# Patient Record
Sex: FEMALE | Race: WHITE | HISPANIC OR LATINO
[De-identification: names, ages, dates, MRNs, and addresses within clinical notes are randomized per-mention and may not be internally consistent; named-entity substitution may affect disease eponyms.]

---

## 2021-12-08 PROBLEM — Z00.00 ENCOUNTER FOR PREVENTIVE HEALTH EXAMINATION: Status: ACTIVE | Noted: 2021-12-08

## 2023-03-30 ENCOUNTER — APPOINTMENT (OUTPATIENT)
Dept: NEUROLOGY | Facility: CLINIC | Age: 73
End: 2023-03-30
Payer: MEDICARE

## 2023-03-30 VITALS
OXYGEN SATURATION: 99 % | WEIGHT: 160 LBS | DIASTOLIC BLOOD PRESSURE: 80 MMHG | BODY MASS INDEX: 29.44 KG/M2 | SYSTOLIC BLOOD PRESSURE: 118 MMHG | HEART RATE: 72 BPM | HEIGHT: 62 IN

## 2023-03-30 DIAGNOSIS — Z87.39 PERSONAL HISTORY OF OTHER DISEASES OF THE MUSCULOSKELETAL SYSTEM AND CONNECTIVE TISSUE: ICD-10-CM

## 2023-03-30 DIAGNOSIS — R25.2 CRAMP AND SPASM: ICD-10-CM

## 2023-03-30 DIAGNOSIS — G44.51 HEMICRANIA CONTINUA: ICD-10-CM

## 2023-03-30 DIAGNOSIS — G89.29 CERVICALGIA: ICD-10-CM

## 2023-03-30 DIAGNOSIS — Z86.79 PERSONAL HISTORY OF OTHER DISEASES OF THE CIRCULATORY SYSTEM: ICD-10-CM

## 2023-03-30 DIAGNOSIS — Z91.81 HISTORY OF FALLING: ICD-10-CM

## 2023-03-30 DIAGNOSIS — E53.8 DEFICIENCY OF OTHER SPECIFIED B GROUP VITAMINS: ICD-10-CM

## 2023-03-30 DIAGNOSIS — M54.2 CERVICALGIA: ICD-10-CM

## 2023-03-30 DIAGNOSIS — F17.200 NICOTINE DEPENDENCE, UNSPECIFIED, UNCOMPLICATED: ICD-10-CM

## 2023-03-30 DIAGNOSIS — Z82.49 FAMILY HISTORY OF ISCHEMIC HEART DISEASE AND OTHER DISEASES OF THE CIRCULATORY SYSTEM: ICD-10-CM

## 2023-03-30 PROCEDURE — 99205 OFFICE O/P NEW HI 60 MIN: CPT

## 2023-04-03 PROBLEM — Z82.49 FAMILY HISTORY OF HEART DISEASE: Status: ACTIVE | Noted: 2023-03-29

## 2023-04-03 PROBLEM — Z87.39 HISTORY OF ARTHRITIS: Status: RESOLVED | Noted: 2023-03-29 | Resolved: 2023-04-03

## 2023-04-03 PROBLEM — Z87.39 HISTORY OF OSTEOPOROSIS: Status: RESOLVED | Noted: 2023-03-29 | Resolved: 2023-04-03

## 2023-04-03 PROBLEM — E53.8 B12 DEFICIENCY: Status: RESOLVED | Noted: 2023-03-29 | Resolved: 2023-04-03

## 2023-04-03 PROBLEM — F17.200 CURRENT EVERY DAY SMOKER: Status: ACTIVE | Noted: 2023-03-29

## 2023-04-03 PROBLEM — Z87.39 HISTORY OF BACK PAIN: Status: RESOLVED | Noted: 2023-03-29 | Resolved: 2023-04-03

## 2023-04-03 PROBLEM — Z86.79 HISTORY OF HYPERTENSION: Status: RESOLVED | Noted: 2023-03-29 | Resolved: 2023-04-03

## 2023-04-03 PROBLEM — R25.2 JERKING MOVEMENTS OF EXTREMITIES: Status: RESOLVED | Noted: 2023-03-29 | Resolved: 2023-04-03

## 2023-04-03 PROBLEM — G44.51 HEMICRANIA CONTINUA: Status: RESOLVED | Noted: 2023-03-29 | Resolved: 2023-04-03

## 2023-04-03 PROBLEM — Z91.81 HISTORY OF FALL: Status: RESOLVED | Noted: 2023-03-29 | Resolved: 2023-04-03

## 2023-04-03 PROBLEM — M54.2 NECK PAIN, CHRONIC: Status: RESOLVED | Noted: 2023-03-29 | Resolved: 2023-04-03

## 2023-04-03 RX ORDER — ALBUTEROL 90 MCG
90 AEROSOL (GRAM) INHALATION
Refills: 0 | Status: ACTIVE | COMMUNITY

## 2023-04-03 NOTE — PHYSICAL EXAM
[General Appearance - Alert] : alert [General Appearance - In No Acute Distress] : in no acute distress [Oriented To Time, Place, And Person] : oriented to person, place, and time [Impaired Insight] : insight and judgment were intact [Affect] : the affect was normal [Person] : oriented to person [Place] : oriented to place [Time] : oriented to time [Concentration Intact] : normal concentrating ability [Visual Intact] : visual attention was ~T not ~L decreased [Naming Objects] : no difficulty naming common objects [Repeating Phrases] : no difficulty repeating a phrase [Writing A Sentence] : no difficulty writing a sentence [Fluency] : fluency intact [Comprehension] : comprehension intact [Reading] : reading intact [Past History] : adequate knowledge of personal past history [Cranial Nerves Optic (II)] : visual acuity intact bilaterally,  visual fields full to confrontation, pupils equal round and reactive to light [Cranial Nerves Oculomotor (III)] : extraocular motion intact [Cranial Nerves Trigeminal (V)] : facial sensation intact symmetrically [Cranial Nerves Facial (VII)] : face symmetrical [Cranial Nerves Vestibulocochlear (VIII)] : hearing was intact bilaterally [Cranial Nerves Glossopharyngeal (IX)] : tongue and palate midline [Cranial Nerves Accessory (XI - Cranial And Spinal)] : head turning and shoulder shrug symmetric [Cranial Nerves Hypoglossal (XII)] : there was no tongue deviation with protrusion [Motor Strength] : muscle strength was normal in all four extremities [No Muscle Atrophy] : normal bulk in all four extremities [Sensation Tactile Decrease] : light touch was intact [Abnormal Walk] : normal gait [Balance] : balance was intact [2+] : Ankle jerk left 2+ [Outer Ear] : the ears and nose were normal in appearance [Oropharynx] : the oropharynx was normal [Auscultation Breath Sounds / Voice Sounds] : lungs were clear to auscultation bilaterally [Heart Rate And Rhythm] : heart rate was normal and rhythm regular [Heart Sounds] : normal S1 and S2 [Heart Sounds Gallop] : no gallops [Murmurs] : no murmurs [Heart Sounds Pericardial Friction Rub] : no pericardial rub [Full Pulse] : the pedal pulses are present [Edema] : there was no peripheral edema [Bowel Sounds] : normal bowel sounds [Abdomen Soft] : soft [Abdomen Tenderness] : non-tender [Abdomen Mass (___ Cm)] : no abdominal mass palpated [No CVA Tenderness] : no ~M costovertebral angle tenderness [No Spinal Tenderness] : no spinal tenderness [Nail Clubbing] : no clubbing  or cyanosis of the fingernails [Musculoskeletal - Swelling] : no joint swelling seen [Motor Tone] : muscle strength and tone were normal [Skin Color & Pigmentation] : normal skin color and pigmentation [Skin Turgor] : normal skin turgor [] : no rash [Past-pointing] : there was no past-pointing [Tremor] : no tremor present [Plantar Reflex Right Only] : normal on the right [Plantar Reflex Left Only] : normal on the left [Glabellar Reflex] : the glabellar reflex was absent [FreeTextEntry4] :  mini-mental status and  29/30 [FreeTextEntry9] :  symmetrical deep tendon reflexes.  plantar flexion bilaterally [FreeTextEntry1] :  unsteady gait, ambulates with a cane occasional myoclonic jerking involving upper extremities seen during evaluation

## 2023-04-03 NOTE — DATA REVIEWED
[de-identified] : MRA head and neck without contrast March 18, 2021\par No hemodynamically significant stenosis\par \par \par MRI brain without contrast March 18, 2021\par No acute changes\par Chronic ischemic white matter disease\par Maxillary sinusitis [de-identified] : Eeg March 25, 2021\par Abnormal awake, drowsy and asleep EEG due to the presence of mild diffuse slowing.\par No focal slowing, no epileptiform abnormalities\par \par  [de-identified] : 1. MRI lumbar spine September 3, 2021, multilevel degenerative disc disease, no cauda equina\par 2. MRI thoracic spine without contrast September 3, 2021, multilevel degenerative disc disease without high-grade spinal stenosis,, no cord involved\par 3. MRI cervical spine, no cord compression, multilevel degenerative changes

## 2023-04-03 NOTE — ASSESSMENT
[FreeTextEntry1] : 72 years old F with  multiple and significant past medical history and risk factors for stroke\par \par 1. Unsteady gait, frequent falls\par Multifactorial etiology due to combination of abnormal gait in elderly, chronic degenerative changes, arthritis, neuropathy\par \par  recommendations\par Offered physical therapy evaluation and treatment for gait, balance, neck and low back pain\par - B12 deficiency, treated\par - fall precaution d/w the pt in detail\par - PT, evaluation physical therapy due to high co-pay\par \par 2.  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 Depakote 125 mg capsules,  1 tabl 3 times a day ( increased from twice a day)\par 2.  seizure precaution discussed with the patient in detail\par  3. Safety of driving discussed with the patient in detail\par \par \par 3.  chronic neck pain, low back pain\par  no worse\par  no new neuro deficit\par  no myelopathic signs\par \par 4.  positional dizziness. Improving\par Based on exam, peripheral vestibulopathy\par Imaging studies showed no vascular abnormalities no evidence of vertebral basilar insufficiency\par \par Recommended\par \par Vestibular physical therapy, strongly recommend\par \par Fall precaution\par Proper hydration\par  imaging study performed,\par  MRA brain and neck vessels showed no hemodynamically significant stenosis, no vertebrobasilar insufficiency\par \par 5.  headache with migrainous component\par  location, postural head region, once-twice per week, intensity 4-5/10, associated with photo  phobia\par  under better control with prophylactic treatment, on magnesium 200 mg  twice per week, hold for diarrhea\par riboflavin 200 mg daily\par  abortive treatment with Tylenol 650 mg as needed 3-4 times a day\par \par \par \par 6.  memory impairment\par Based on history and physical exam, patient needs memory impairment workup\par 1. MRI Brain without contrast, evaluate structural brain abnormality, the degree of gliosis\par  consider to repeat MRI brain\par  previous MRI brain without contrast March 18, 2021\par No acute changes\par Chronic ischemic white matter disease\par 2. EEG to check cerebral function, consider to repeat\par 3. Lab workup, reversible etiology of memory loss protocol\par CBC, BMP, Hepatic function, Homocysteine, Folate, Methymalonic acid, magnesium, ESR, CRP, CPK, TSH, Free T3/T4, B1, B2, B6, B12, C, D, E, Lyme, RPR, THOMAS, Hg A1c\par 4. Thiamine 100 mg for 10 days, followed by twice/week\par 5. Healthy lifestyle, nutrition, physical activity discussed in detail\par 6. Treatment of co morbidities,\par 7. Stroke risk factors modification\par 8. Consider official Psycho neurophysiological evaluation,\par 9. Consider PET CT, diagnostic\par Further management depends on patient's condition, diagnostic study work up.\par \par Spent 60 minutes with evaluation, discussion, counseling, answering multiple questions, reassurance and education\par \par On exam no new focal neuro deficit\par \par \par RTC 4-6 months or earlier as needed\par \par  before next visit\par  laboratory workup, reversible etiology of memory loss protocol

## 2023-04-03 NOTE — REVIEW OF SYSTEMS
[Memory Lapses or Loss] : memory loss [Decr. Concentrating Ability] : decreased concentrating ability [Seizures] : convulsions [Dizziness] : dizziness [Lightheadedness] : lightheadedness [Difficulty Walking] : difficulty walking [Anxiety] : anxiety [Depression] : depression [Joint Pain] : joint pain [Negative] : Heme/Lymph [de-identified] :  myoclonic jerking\par  headache\par  unsteady gait [de-identified] :  mood is under control at present time

## 2023-04-03 NOTE — HISTORY OF PRESENT ILLNESS
[FreeTextEntry1] :  follow-up neurology visit March 30, 2023\par  last evaluated in Neurology March 8, 2022\par \par  the patient is a 72 years old right-handed F with a PMH of HTN, anxiety, cervical spinal fusion surgery (C4-C6), who presented 8/16/2019 with c/o increased frequency of falling for the past 5-7 years, unsteady gait, dizziness, jerking movements involving upper extremities and torso\par \par Reported chronic neck pain, abnormal involuntary movements of face and arms, and positional dizziness x 25 years.\par \par \par 1. Falls: past 5 -7  years, getting worse with time, R>L, increased in frequency. Pt was recently hospitalized for trauma secondary to falling off bed in 6/21/19. CT Head done showed no acute findings, and CT Neck done (both CT were non-contrast and were done 6/21/19) showed s/p fusion C4-C6, no acute fracture or subluxation. Pt referred here for frequent falls.\par \par ROS: As per pt, no LOC for any fall, no seizure history or symptoms, + chronic urinary incontinence unchanged from baseline (not present or associated with falls), + constipation, chronic, positive (possible) orthostatic symptoms of lightheadedness chronic, numbness/tingling in hands and legs chronic, and + eye droop/facial asymmetry since 5-6 years. + balance problems/gait unsteadiness chronic since neck surgery 1990s, and arm and facial abnormal involuntary movements (tics) present as well as tremor in hands. Slow, disjointed speech.\par \par Pt also endorsed pressure and radiation of pain from head to jaw (possible trigeminal neuralgia).\par \par 2. Unsteadiness: Unsteady gait, chronic, R>L. see review of system\par \par 3. Neck pain since 1990s (s/p cervical fusion surgery).\par \par B/l, chronic, moderate in intensity, associated with muscle stiffness, decreased ROM, not radiating\par \par 4. Tick: Abnormal motor tics of face and arms, random, appear to affect speech, onset 25 years ago.\par \par 5. Dizziness, positional. Possible orthostatic.\par \par No associated diplopia, new focal weakness or numbness\par \par \par \par she was started on treatment with Depakote 250 mg twice a day on February 24, 2021 for increase frequent myoclonic jerking\par \par reported positive effect from Depakote on previous visit in April 2021\par \par  Diagnostic workup performed, reviewed in detail\par \par 1. Eeg March 25, 2021\par \par Abnormal awake, drowsy and asleep EEG due to the presence of mild diffuse slowing.\par No focal slowing, no epileptiform abnormalities\par \par \par 2. MRA head and neck without contrast March 18, 2021\par No hemodynamically significant stenosis\par \par \par 3. MRI brain without contrast March 18, 2021\par No acute changes\par Chronic ischemic white matter disease\par Maxillary sinusitis\par \par  March 30, 2023\par Patient presents for follow-up\par Reports feeling better\par States the frequency and intensity of jerking movements significantly decreased since on treatment with Depakote, however she still have occasional jerks\par Only occasionally occurs and very mild\par Not associated with confusion\par No nocturnal tongue biting\par No nocturnal urinary incontinence\par \par The intensity of headache decreased, occurs occasionally only\par The frequency and intensity of dizziness occurs occasionally only, positional, short lasting up to 10-15 seconds\par No associated with diplopia, dysarthria, dysphagia, no new focal weakness or numbness\par  patient states headache usually associated with stress\par \par \par Balance, no worsening\par Reports an episode of fall, mechanical, no loss of consciousness, no head or neck trauma associated\par Ambulates with a cane\par States during walking she lean forward\par \par  states he memory became worse, she forgets more\par  describes predominantly short-term memory loss\par Difficulty with\par \par 1. Retaining new information, trouble remembering events, yes\par 2. Handling complex tasks, balancing a checkbook, mild\par 3. Reasoning, difficulty/ unable to cope with unexpected events, no\par 4. Spatial ability and orientation, getting lost in familiar places, mild\par 5. Language, word finding, mild\par 6. Behavior, no changes\par \par Reports no new symptoms\par States has scheduled surgery with ophthalmologist for glaucoma on the right eye, prescheduled surgery at the end of March 2022\par \par No new medications initiated\par No hospitalization since previous evaluation

## 2023-04-03 NOTE — DISCUSSION/SUMMARY
[FreeTextEntry1] : 72 years old F with a PMH of HTN, anxiety, cervical spinal fusion surgery (C4-C6), chronic neck and low back pain initially presented with\par - increased frequency of falling, unsteady gait, dizziness abnormal jerking,  headache\par No worsening\par \par 1. Unsteady gait, frequent falls\par Multifactorial etiology due to combination of abnormal gait in elderly, chronic degenerative changes, arthritis, neuropathy\par Associated with urine dysfunction,  long-standing, no worsening, follows with urology\par Low back pain, neck pain and stiffness-no worsening\par  no new focal neuro deficit, no myelopathic signs on exam\par \par  recommendations\par Continue\par Voltaren gel 1% topical on neck and low back area, 3-4 times a day as needed\par Offered physical therapy evaluation and treatment for gait, balance, neck and low back pain\par Based on CC, HPI, neurological exam - multifactorial etiology due to combination of abnormal gait in elderly, chronic degenerative changes, polyneuropathy, vestibulopathy, spondylosis\par -Known longstanding spinal problems\par  Diagnostic workup performed in the past\par  MRI spine showed no spinal cord involvement\par - B12 deficiency, treated\par - fall precaution d/w the pt in detail\par - PT, evaluation physical therapy due to high co-pay\par \par II.  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 Depakote 125 mg capsules,  1 tabl 3 times a day \par 2. Fall precaution discussed with the patient in detail\par \par \par \par III.  chronic neck pain, low back pain\par  no worse\par  no new neuro deficit\par  no myelopathic signs\par \par III.  positional dizziness. Improving\par Based on exam, peripheral vestibulopathy\par Imaging studies showed no vascular abnormalities no evidence of vertebral basilar insufficiency\par \par Recommended\par \par Vestibular physical therapy, strongly recommend\par \par Fall precaution\par Proper hydration\par  imaging study performed,\par  MRA brain and neck vessels showed no hemodynamically significant stenosis, no vertebrobasilar insufficiency\par \par IV. Dysphagia\par Evaluated by speech and swallow therapist 1 year ago\par Choking precaution discussed with the patient in detail\par \par \par Plan\par \par 1. Choking precaution, aspiration precaution discussed with the patient\par 2. Recommended small dose of Valium 2 mg as needed twice a day, will benefit for anxiety, muscle stiffness, insomnia\par  patient takes occasional only\par \par V. anxiety\par  comorbidities\par Recommended to continue Paxil 20 mg every day\par B12 1000 mcg daily\par Vit D 2000 IU QD\par Vit E 400 IU QD x 3 mo\par Proper hydration\par Baclofen 10 mg 2-3 times a day if needed only for neck muscle stiffness, back muscle stiffness\par Naproxen 500 mg twice a day as needed to take his foot\par \par VI.  memory impairment\par Based on history and physical exam, patient needs memory impairment workup\par 1. MRI Brain without contrast, evaluate structural brain abnormality, the degree of gliosis\par  consider to repeat MRI brain\par  previous MRI brain without contrast March 18, 2021\par No acute changes\par Chronic ischemic white matter disease\par 2. EEG to check cerebral function, consider to repeat\par 3. Lab workup, reversible etiology of memory loss protocol\par CBC, BMP, Hepatic function, Homocysteine, Folate, Methymalonic acid, magnesium, ESR, CRP, CPK, TSH, Free T3/T4, B1, B2, B6, B12, C, D, E, Lyme, RPR, THOMAS, Hg A1c\par 4. Thiamine 100 mg for 10 days, followed by twice/week\par 5. Healthy lifestyle, nutrition, physical activity discussed in detail\par 6. Treatment of co morbidities,\par 7. Stroke risk factors modification\par 8. Consider official Psycho neurophysiological evaluation,\par 9. Consider PET CT\par \par  headache, no worsening\par  positive migrainosus signs\par Further management depends on patient's condition, diagnostic study work up.\par \par Spent 60 minutes with evaluation, discussion, counseling, answering multiple questions, reassurance and education\par \par On exam no new focal neuro deficit\par \par \par RTC 4-6 months or earlier as needed\par \par \par  discussion\par Smoking cessation discussed with the patient in detail\par Medication compliance, medication side effect and adverse reaction,\par The importance of compliance with medications was reinforced,\par High frequency and serious potential medication adverse effects were reviewed with the patient in detail, including but not exclusive to psychiatric effects, dizziness, decreased concentration.\par Information sheets on medication side effects were made available to the patient in our clinic.\par Stroke risk factors modification, blood pressure control, heart rate and rhythm control, cholesterol and glucose level control,\par Stress reduction,\par Healthy lifestyle,\par Healthy nutrition, Weight modification,\par Physical activity,\par Sleep hygiene and the risk of sleep disruption/deprivation were discussed.\par Fall precaution.\par As per my usual protocol the patient was advised in regards to risks and driving when on medicamentous treatment with s/e as dizziness, drowsiness,\par Recommendations include avoid driving, not to swim alone, not to be bathing alone with locked door, climbing to high places and operating heavy machinery while on medications with the above s/e, to prevent fall, accidents, injury\par Patient was educated in regards to bone health and an increased risk of osteoporosis,\par \par The patient understood the advice\par \par Spent 60 mins with evaluation, counselling, discussion with pt\par \par \par \par

## 2023-06-21 ENCOUNTER — FORM ENCOUNTER (OUTPATIENT)
Age: 73
End: 2023-06-21

## 2023-07-20 ENCOUNTER — FORM ENCOUNTER (OUTPATIENT)
Age: 73
End: 2023-07-20

## 2023-07-26 ENCOUNTER — APPOINTMENT (OUTPATIENT)
Dept: NEUROLOGY | Facility: CLINIC | Age: 73
End: 2023-07-26

## 2023-08-22 ENCOUNTER — APPOINTMENT (OUTPATIENT)
Dept: NEUROLOGY | Facility: CLINIC | Age: 73
End: 2023-08-22
Payer: MEDICARE

## 2023-08-22 VITALS
BODY MASS INDEX: 29.44 KG/M2 | HEIGHT: 62 IN | OXYGEN SATURATION: 99 % | WEIGHT: 160 LBS | SYSTOLIC BLOOD PRESSURE: 116 MMHG | HEART RATE: 76 BPM | DIASTOLIC BLOOD PRESSURE: 82 MMHG

## 2023-08-22 DIAGNOSIS — R56.9 UNSPECIFIED CONVULSIONS: ICD-10-CM

## 2023-08-22 DIAGNOSIS — Z86.69 PERSONAL HISTORY OF OTHER DISEASES OF THE NERVOUS SYSTEM AND SENSE ORGANS: ICD-10-CM

## 2023-08-22 PROCEDURE — 99215 OFFICE O/P EST HI 40 MIN: CPT

## 2023-08-22 RX ORDER — PAROXETINE HYDROCHLORIDE 20 MG/1
20 TABLET, FILM COATED ORAL DAILY
Qty: 90 | Refills: 1 | Status: COMPLETED | COMMUNITY
Start: 2023-06-20 | End: 2023-08-22

## 2023-08-22 RX ORDER — FLUCONAZOLE 150 MG/1
150 TABLET ORAL
Qty: 2 | Refills: 0 | Status: COMPLETED | COMMUNITY
Start: 2023-01-25 | End: 2023-08-22

## 2023-08-22 RX ORDER — BUDESONIDE AND FORMOTEROL FUMARATE DIHYDRATE 80; 4.5 UG/1; UG/1
80-4.5 AEROSOL RESPIRATORY (INHALATION)
Refills: 0 | Status: COMPLETED | COMMUNITY
End: 2023-08-22

## 2023-08-22 RX ORDER — LEVOCETIRIZINE DIHYDROCHLORIDE 5 MG/1
5 TABLET ORAL
Refills: 0 | Status: COMPLETED | COMMUNITY
End: 2023-08-22

## 2023-08-22 RX ORDER — PAROXETINE HYDROCHLORIDE 20 MG/1
20 TABLET, FILM COATED ORAL
Refills: 0 | Status: COMPLETED | COMMUNITY
End: 2023-08-22

## 2023-08-22 RX ORDER — NYSTATIN 100000 [USP'U]/G
100000 CREAM TOPICAL
Qty: 15 | Refills: 0 | Status: COMPLETED | COMMUNITY
Start: 2023-01-25 | End: 2023-08-22

## 2023-08-22 RX ORDER — FOLIC ACID 1 MG/1
1 TABLET ORAL
Refills: 0 | Status: COMPLETED | COMMUNITY
End: 2023-08-22

## 2023-08-22 RX ORDER — NAPROXEN 500 MG/1
TABLET ORAL
Refills: 0 | Status: COMPLETED | COMMUNITY
End: 2023-08-22

## 2023-08-22 RX ORDER — MIRABEGRON 50 MG/1
50 TABLET, FILM COATED, EXTENDED RELEASE ORAL
Refills: 0 | Status: COMPLETED | COMMUNITY
End: 2023-08-22

## 2023-08-22 RX ORDER — TRIAMCINOLONE ACETONIDE 1 MG/G
0.1 CREAM TOPICAL
Qty: 30 | Refills: 0 | Status: COMPLETED | COMMUNITY
Start: 2022-12-19 | End: 2023-08-22

## 2023-08-22 RX ORDER — DIVALPROEX SODIUM 125 MG/1
125 CAPSULE, COATED PELLETS ORAL
Refills: 0 | Status: COMPLETED | COMMUNITY
End: 2023-08-22

## 2023-08-23 NOTE — PHYSICAL EXAM
[General Appearance - Alert] : alert [Sclera] : the sclera and conjunctiva were normal [PERRL With Normal Accommodation] : pupils were equal in size, round, reactive to light, with normal accommodation [Outer Ear] : the ears and nose were normal in appearance [Neck Appearance] : the appearance of the neck was normal [] : no respiratory distress [FreeTextEntry1] : GENERAL PHYSICAL EXAM: GEN: no distress, appears anxious, teary  EYES: sclera white, conjunctiva clear, no nystagmus PULM: no respiratory distress EXT: no edema, no cyanosis MSK: whole body myoclonus present, prominent in arms, legs with some facial involvement SKIN: warm, dry, no rash or lesion on exposed skin   NEUROLOGICAL EXAM: Mental Status Orientation: alert and oriented to person, place, and situation, disoriented to time Language: mild dysarthria , fluent, intact comprehension and repetition   Cranial Nerves II: visual fields full to confrontation III, IV, VI: PERRL, EOMI V, VII: facial sensation intact, facial ,myoclonus VIII: hearing intact IX, X: uvula midline, soft palate elevates normally XI: BL shoulder shrug intact XII: tongue midline   Motor Bilateral muscle strength 4/5 in UE and LE, proximally and distally Hand  3/5 Tone and bulk are normal for age in upper and lower limbs Myoclonus in all extremities worse with posture  Sensation Intact to light touch in all 4 EXTs   Reflex 2+ in BL biceps, brachioradialis, patella   Coordination Normal FTN bilaterally on second attempt, minor dysmetria on first attempt   Gait unsteady with cane, vearing to Left  [General Appearance - In No Acute Distress] : in no acute distress

## 2023-08-23 NOTE — ASSESSMENT
[FreeTextEntry1] : Diamond Hillman is a 74 YO female who presents today to establish care for preexisting diagnoses. 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 Daughter is present with her today, reports the patient ran out of all medications about a month ago. Whole body myoclonus present on examination, worse with action, most present in b/l UE. Pt startles easily. Headaches likely tension headaches, related to chronic cervical pain.  Neurological exam otherwise unremarkable.  I have some concern for possibly an antibody mediated process or possibly prion disease as cause for who body myoclonus and short-term memory loss. Would also consider possibly late onset Tiff's. I have less suspicion for a myoclonic epilepsy.    24 hour ambulatory EEG to monitor for seizure activity. MRI Brain w & w/o IV cont for updated imaging, r/o pathologic cause for whole body myoclonus. Lab work: ENS2 antibodies, LGI1 and caspr2 ab.    Renewed medications: - Paroxetine 20mg daily for Anxiety. - Baclofen 10mg up to TID as needed for muscle spasms of back and neck. - Naproxen 500mg BID as needed for headaches. - Depakote 250mg BID for myoclonus.  Educated on Medication compliance and the need to call before running out of medications, as to avoid potential risks and side effects from abruptly stopping medication.   Follow-up in 4-6 weeks, after testing performed.

## 2023-08-23 NOTE — REVIEW OF SYSTEMS
[Confused or Disoriented] : confusion [Memory Lapses or Loss] : memory loss [Decr. Concentrating Ability] : decreased concentrating ability [Arm Weakness] : arm weakness [Hand Weakness] :  hand weakness [Leg Weakness] : leg weakness [Numbness] : numbness [Tingling] : tingling [Seizures] : convulsions [Tension Headache] : tension-type headaches [Difficulty Walking] : difficulty walking [Frequent Falls] : frequent falls [As Noted in HPI] : as noted in HPI [Anxiety] : anxiety [Negative] : Integumentary [Fever] : no fever [Chills] : no chills [Difficulty with Language] : no ~M difficulty with language [Facial Weakness] : no facial weakness [Dizziness] : no dizziness [Fainting] : no fainting [Lightheadedness] : no lightheadedness [Vertigo] : no vertigo [Eye Pain] : no eye pain [Earache] : no earache [Shortness Of Breath] : no shortness of breath [FreeTextEntry9] : Chronic back and neck pain

## 2023-08-23 NOTE — HISTORY OF PRESENT ILLNESS
[FreeTextEntry1] : Diamond Hillman is a 74 YO female who presents today to establish care for preexisting diagnoses. 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 Daughter is present with her today, reports the patient ran out of all medications about a month ago, and did not attend follow-up appointments, as she forgets them, so she plans attend all future appointments with her.   1. 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 Started about 2 years ago. Worse with anxiety and stress. slurred speech due to tongue jerking. Startles easily. Hand weakness is present bilaterally, notices she has been dropping items more often. Has fallen about 3 times in the last month, sustaining a forehead laceration once. Facial Tic: Abnormal motor tics of face and arms, worse with speech and activity.  2. Daughter reports memory issues are getting more concerning. Pt is getting lost while driving and forgetting recent conversations often. Pt agrees with daughter about memory. Paroxetine prescribed by Mike CARVAJAL for anxiety. Plan for lithotripsy for kidney stones next month. Denies vision or hearing changes. reports numbness to bilateral hands.   3. Headaches 5x week start around temples and radiates to neck. 14-15 headaches days a month. pain 8/10 at its worst. Takes Naproxen PRN for headaches with good relief. Denies nausea, vomiting, photophobia or phonophobia. Associated with Neck pain.

## 2023-09-27 ENCOUNTER — APPOINTMENT (OUTPATIENT)
Dept: NEUROLOGY | Facility: CLINIC | Age: 73
End: 2023-09-27
Payer: MEDICARE

## 2023-09-27 PROCEDURE — 95816 EEG AWAKE AND DROWSY: CPT

## 2023-09-27 PROCEDURE — 93040 RHYTHM ECG WITH REPORT: CPT

## 2023-09-28 PROCEDURE — 95700 EEG CONT REC W/VID EEG TECH: CPT

## 2023-09-28 PROCEDURE — 95708 EEG WO VID EA 12-26HR UNMNTR: CPT

## 2023-09-28 PROCEDURE — 95719 EEG PHYS/QHP EA INCR W/O VID: CPT

## 2023-10-03 ENCOUNTER — APPOINTMENT (OUTPATIENT)
Dept: NEUROLOGY | Facility: CLINIC | Age: 73
End: 2023-10-03
Payer: MEDICARE

## 2023-10-03 VITALS
HEIGHT: 62 IN | WEIGHT: 160 LBS | DIASTOLIC BLOOD PRESSURE: 70 MMHG | BODY MASS INDEX: 29.44 KG/M2 | SYSTOLIC BLOOD PRESSURE: 110 MMHG

## 2023-10-03 DIAGNOSIS — R41.3 OTHER AMNESIA: ICD-10-CM

## 2023-10-03 PROCEDURE — 99214 OFFICE O/P EST MOD 30 MIN: CPT

## 2023-10-17 ENCOUNTER — APPOINTMENT (OUTPATIENT)
Dept: NEUROLOGY | Facility: CLINIC | Age: 73
End: 2023-10-17

## 2023-11-01 ENCOUNTER — NON-APPOINTMENT (OUTPATIENT)
Age: 73
End: 2023-11-01

## 2023-11-07 ENCOUNTER — APPOINTMENT (OUTPATIENT)
Dept: NEUROLOGY | Facility: CLINIC | Age: 73
End: 2023-11-07
Payer: MEDICARE

## 2023-11-07 VITALS
BODY MASS INDEX: 29.44 KG/M2 | HEART RATE: 68 BPM | OXYGEN SATURATION: 96 % | HEIGHT: 62 IN | DIASTOLIC BLOOD PRESSURE: 80 MMHG | SYSTOLIC BLOOD PRESSURE: 124 MMHG | WEIGHT: 160 LBS

## 2023-11-07 VITALS — BODY MASS INDEX: 28.9 KG/M2 | WEIGHT: 158 LBS

## 2023-11-07 DIAGNOSIS — F41.9 ANXIETY DISORDER, UNSPECIFIED: ICD-10-CM

## 2023-11-07 DIAGNOSIS — G25.0 ESSENTIAL TREMOR: ICD-10-CM

## 2023-11-07 DIAGNOSIS — M47.12 OTHER SPONDYLOSIS WITH MYELOPATHY, CERVICAL REGION: ICD-10-CM

## 2023-11-07 PROCEDURE — 99214 OFFICE O/P EST MOD 30 MIN: CPT

## 2023-11-08 PROBLEM — M47.12 OSTEOARTHRITIS OF CERVICAL SPINE WITH MYELOPATHY: Status: ACTIVE | Noted: 2023-11-08

## 2023-11-08 PROBLEM — F41.9 ANXIETY DISORDER, UNSPECIFIED TYPE: Status: ACTIVE | Noted: 2023-03-29

## 2023-11-08 RX ORDER — NAPROXEN 500 MG/1
500 TABLET ORAL
Qty: 60 | Refills: 6 | Status: ACTIVE | COMMUNITY
Start: 1900-01-01 | End: 1900-01-01

## 2023-11-08 RX ORDER — BACLOFEN 10 MG/1
10 TABLET ORAL 3 TIMES DAILY
Qty: 90 | Refills: 6 | Status: ACTIVE | COMMUNITY
Start: 1900-01-01 | End: 1900-01-01

## 2023-11-15 RX ORDER — CARBIDOPA AND LEVODOPA 25; 250 MG/1; MG/1
25-250 TABLET ORAL
Qty: 360 | Refills: 1 | Status: DISCONTINUED | COMMUNITY
Start: 2023-11-08 | End: 2023-11-15

## 2023-12-19 ENCOUNTER — APPOINTMENT (OUTPATIENT)
Dept: NEUROLOGY | Facility: CLINIC | Age: 73
End: 2023-12-19
Payer: MEDICARE

## 2023-12-19 VITALS
BODY MASS INDEX: 29.44 KG/M2 | HEART RATE: 74 BPM | SYSTOLIC BLOOD PRESSURE: 118 MMHG | OXYGEN SATURATION: 99 % | HEIGHT: 62 IN | DIASTOLIC BLOOD PRESSURE: 78 MMHG | WEIGHT: 160 LBS

## 2023-12-19 DIAGNOSIS — R25.2 CRAMP AND SPASM: ICD-10-CM

## 2023-12-19 DIAGNOSIS — R51.9 HEADACHE, UNSPECIFIED: ICD-10-CM

## 2023-12-19 DIAGNOSIS — M54.2 CERVICALGIA: ICD-10-CM

## 2023-12-19 PROCEDURE — 99215 OFFICE O/P EST HI 40 MIN: CPT

## 2023-12-19 RX ORDER — MEMANTINE HYDROCHLORIDE 5 MG/1
5 TABLET, FILM COATED ORAL
Qty: 90 | Refills: 1 | Status: ACTIVE | COMMUNITY
Start: 2023-11-08 | End: 1900-01-01

## 2023-12-19 NOTE — PHYSICAL EXAM
[General Appearance - Alert] : alert [General Appearance - Well Nourished] : well nourished [General Appearance - Well Developed] : well developed [Affect] : the affect was normal [Mood] : the mood was normal [Oriented to Person] : oriented to person [Oriented to Time] : oriented to time [Person] : oriented to person [___ / 5] : Visuospatial / Executive: [unfilled] / 5 [___ / 3] : Attention (Serial 7 subtraction): [unfilled] / 3 [___ / 1] : Fluency: [unfilled] / 1 [___ / 2] : Abstraction: [unfilled] / 2 [___ / 5] : Delayed Recall: [unfilled] / 5 [___ / 6] : Orientation: [unfilled] / 6 [Cranial Nerves Optic (II)] : visual acuity intact bilaterally,  visual fields full to confrontation, pupils equal round and reactive to light [Cranial Nerves Oculomotor (III)] : extraocular motion intact [Cranial Nerves Trigeminal (V)] : facial sensation intact symmetrically [Cranial Nerves Facial (VII)] : face symmetrical [Cranial Nerves Vestibulocochlear (VIII)] : hearing was intact bilaterally [Cranial Nerves Glossopharyngeal (IX)] : tongue and palate midline [Cranial Nerves Accessory (XI - Cranial And Spinal)] : head turning and shoulder shrug symmetric [Cranial Nerves Hypoglossal (XII)] : there was no tongue deviation with protrusion [Motor Strength] : muscle strength was normal in all four extremities [No Muscle Atrophy] : normal bulk in all four extremities [Motor Handedness Right-Handed] : the patient is right hand dominant [Sensation Tactile Decrease] : light touch was intact [Sensation Vibration Decrease] : vibration was intact [Proprioception] : proprioception was intact [Tremor] : a tremor present [2+] : Patella left 2+ [PERRL With Normal Accommodation] : pupils were equal in size, round, reactive to light, with normal accommodation [Extraocular Movements] : extraocular movements were intact [Outer Ear] : the ears and nose were normal in appearance [Hearing Threshold Finger Rub Not [place]] : hearing was normal [Neck Appearance] : the appearance of the neck was normal [Neck Cervical Mass (___cm)] : no neck mass was observed [Exaggerated Use Of Accessory Muscles For Inspiration] : no accessory muscle use [Auscultation Breath Sounds / Voice Sounds] : lungs were clear to auscultation bilaterally [Heart Rate And Rhythm] : heart rate was normal and rhythm regular [Heart Sounds] : normal S1 and S2 [Murmurs] : no murmurs [Arterial Pulses Carotid] : carotid pulses were normal with no bruits [Abdomen Soft] : soft [Abdomen Tenderness] : non-tender [Ataxic] : ataxic [] : no rash [Skin Lesions] : no skin lesions [MocaTotal] : 12 [Memory Remote] : remote memory was not impaired [Oriented to Place] : disoriented to place [Place] : disoriented to place [Time] : disoriented to time [Short Term Intact] : short term memory impaired [Remote Intact] : remote memory impaired [Span Intact] : the attention span was decreased [Romberg's Sign] : Romberg's sign was negtive [FreeTextEntry5] : Twitching of the facial muscles bilaterally [FreeTextEntry1] : Decreased range of motion with flexion extension, lateral bending and rotation secondary to pain and stiffness.  Neck is nontender to palpation at bilateral and midline.

## 2023-12-19 NOTE — DISCUSSION/SUMMARY
[FreeTextEntry1] : 73-year-old female with history of myoclonic seizures, anxiety disorder, cervicalgia with headache, and dementia with psychosis. Significant anxiety related to her medical condition and memory.   1.  For anxiety continue paroxetine 25 mg daily  2.  For myoclonic seizures continue Depakote 250 mg every 12 hours.  3.  For dementia with psychosis continue donepezil 5 mg nightly, continue Seroquel 25 mg nightly. Also helping with sleep.   4. Headache- cervicalgia- take baclofen and 2 tabs naproxen together. Consider trigger point injections. Physical therapy referral.   5.  Discussed risk of delirium if she ever needs to be hospitalized.  All medications refilled.   RTC as needed for TPI. 3-4 months. for follow up    Total time spent on the day of the visit, including pre-visit and post-visit time was 45 minutes.

## 2023-12-19 NOTE — HISTORY OF PRESENT ILLNESS
[FreeTextEntry1] : 73-year-old woman history of seizures, headache, dementia, essential tremor.  Here today for follow-up last seen 11/13.  Since last visit: Tremors are getting better, still has some. Notices it more when she gets anxious or upset, or she gets headache. The tremors have not been interfering with daily functioning. Daughter agrees. Anxiety has been more under control. She gets scared about whether her symptoms are going to stick around. She is terrified about the medications that she is taking and that her life is coming to an end. She is worried that she is putting pressure on her family and being a burden. She has headaches now that she finds to be different from the headaches she had all her life. She has no headache, but usually it's present from the time she wakes until night. Starts in the back of the head, radiating forward and that her eye is closing or drooping. She took 500mg naproxen but barely helped.  Sleeping has been good with the quetiapine. She was not doing well when they had a large crowds.    Current medications include Depakote 250 mg every 12 hours, sertraline 12.5 mg nightly naproxen 500 mg every 12 hours as needed and baclofen 10 mg every 8 hours/as needed cervical spasms., paroxetine 20mg, Sinemet 25/100 BID, Donepezil 5 qhs, amantadine 5mg AM, and seroquel 12.5mg at night.  Initially HPI : 73-year-old female is being seen for follow-up evaluation for myoclonic seizures, anxiety disorder, chronic neck pain with spasms, tremors and dementia with anxiety. Complaint with cervicalgia patient is status post ACDF at C4-5, experiences bilateral upper and lower extremity numbness tingling with weakness without dysesthesias.   Cognitive questioning is positive for loss of recognition of immediate family members, positive for verbal and visual hallucinations, positive for loss of geography where patient is no longer allowed to leave the house alone or operate a motor vehicle, history of misplacement of car keys cell phone TV remote, history of entering the room and forgetting why, concerns with appliances to include leaving stove on for 6 running refrigerator door open and front doors open with keys and lack, patient is at increased risk for fall and now uses rolling walker, lives with brother 80 years old.  Patient does not require assistance with toileting normal daily activities to include laying out close nor sleeping in same days close, demonstrates no mood/behavioral changes, does not use shopping lists or calendars but is escorted to medical visits and shopping, denies missing meals or scheduled medications but where brother-in-law dispenses meds with use of pill dispenser.  MoCA 12/30.  Routine EEG abnormal with mild diffuse slowing with shifting of left and right frontotemporal focal polymorphic delta slowing without elliptic form abnormalities.  Impression mild diffuse cerebral dysfunction is nonspecific etiology.  24-hour EEG was abnormal with mild diffuse slowing no focal or elliptic form abnormalities no clinical events with impression mild diffuse cerebral dysfunction is nonspecific in etiology.  MRI brain demonstrated bilateral parietal and temporal atrophy. Recent labs folate greater than 20, vitamin B12 362, vitamin D 21.8, H&H 14.2/44.6, MCV 93.7 MCH 29.8 MCHC 31.8 RDW 14 platelets 243,   HDL 46 , hemoglobin A1c 6.0.

## 2024-03-28 ENCOUNTER — APPOINTMENT (OUTPATIENT)
Dept: NEUROLOGY | Facility: CLINIC | Age: 74
End: 2024-03-28
Payer: MEDICARE

## 2024-03-28 VITALS
OXYGEN SATURATION: 96 % | BODY MASS INDEX: 29.44 KG/M2 | DIASTOLIC BLOOD PRESSURE: 80 MMHG | SYSTOLIC BLOOD PRESSURE: 145 MMHG | HEIGHT: 62 IN | HEART RATE: 69 BPM | WEIGHT: 160 LBS

## 2024-03-28 DIAGNOSIS — R26.81 UNSTEADINESS ON FEET: ICD-10-CM

## 2024-03-28 DIAGNOSIS — G25.3 MYOCLONUS: ICD-10-CM

## 2024-03-28 DIAGNOSIS — F03.C4: ICD-10-CM

## 2024-03-28 DIAGNOSIS — G47.52 REM SLEEP BEHAVIOR DISORDER: ICD-10-CM

## 2024-03-28 PROCEDURE — 99214 OFFICE O/P EST MOD 30 MIN: CPT

## 2024-03-28 NOTE — DISCUSSION/SUMMARY
[FreeTextEntry1] :    73-year-old female with history of myoclonic seizures, anxiety disorder, cervicalgia with headache, and dementia with psychosis. She is having significantly more paranoia about strangers and going outside, limiting her going outside the home, which is now to the point of agoraphobia. She also has possible REM sleep disorder. Her tremors and myoclonus have also gotten significantly worse. I am suspicious for Lewy Body dementia. She had two family members diagnosed with PD with similar presentations as hers.   F/u geriatric psychiatry melatonin for REM sleep disorder.  food schedule, increase protein intake increase hydration 2-3 bottles of water a day Advised no driving because of lack of coordination and anxiety   1. For anxiety continue paroxetine 25 mg daily  2. For myoclonic seizures continue Depakote 250 mg every 12 hours.  3. For dementia with psychosis continue donepezil 5 mg nightly, continue Seroquel 25 mg nightly. Also helping with sleep.  RTC 3 months

## 2024-03-28 NOTE — HISTORY OF PRESENT ILLNESS
[FreeTextEntry1] : Since last visit: Having vivid dreams. She will see a group of men or teenagers and they are chasing her and she is scared. She feels anxious and phobic.  She will see them when she gets up. Her appetite has been poor. Only eating crackers or nuts, not a full meal. All day she feels like getting electrical stimulation on the back of her neck. Per her daughter about 2 weeks ago she called her saying that someone was going to take her, much more paranoid, afraid something bad was going to happen. They were doing work on her apartment building roof but she got really worked up. She will be afraid of people, that they have bad intentions towards her. She is becoming more agoraphobic.  She did not sleep for 2 nights. She was up walking around checking her apartment.  Shaking has also gotten worse. Over the weekend was worse. She at times almost falls out of bed. She does have fighting in her sleep.   She has family hx of her mother and aunt with "parkinsons" with similar shaking as her.   Seroquel is 25mg at night, paroxetine 25mg , donepezil 5mg at night, memantine 5mg VPA 350mg BID.

## 2024-03-28 NOTE — PHYSICAL EXAM
[General Appearance - Well Developed] : well developed [General Appearance - Alert] : alert [General Appearance - Well Nourished] : well nourished [Memory Remote] : remote memory was not impaired [Oriented to Person] : oriented to person [Oriented to Place] : disoriented to place [Oriented to Time] : oriented to time [Person] : oriented to person [Place] : disoriented to place [Short Term Intact] : short term memory impaired [Time] : disoriented to time [Remote Intact] : remote memory impaired [Span Intact] : the attention span was decreased [Cranial Nerves Optic (II)] : visual acuity intact bilaterally,  visual fields full to confrontation, pupils equal round and reactive to light [Cranial Nerves Trigeminal (V)] : facial sensation intact symmetrically [Cranial Nerves Oculomotor (III)] : extraocular motion intact [Cranial Nerves Vestibulocochlear (VIII)] : hearing was intact bilaterally [Cranial Nerves Facial (VII)] : face symmetrical [Cranial Nerves Glossopharyngeal (IX)] : tongue and palate midline [Cranial Nerves Hypoglossal (XII)] : there was no tongue deviation with protrusion [Cranial Nerves Accessory (XI - Cranial And Spinal)] : head turning and shoulder shrug symmetric [Motor Strength] : muscle strength was normal in all four extremities [Motor Handedness Right-Handed] : the patient is right hand dominant [No Muscle Atrophy] : normal bulk in all four extremities [Sensation Vibration Decrease] : vibration was intact [Sensation Tactile Decrease] : light touch was intact [Tremor] : a tremor present [Romberg's Sign] : Romberg's sign was negtive [Proprioception] : proprioception was intact [2+] : Patella right 2+ [Snout Present] : snout reflex present [Primitive Reflexes] : primitive reflexes were present [Primitive Reflexes Galant] : [place] reflex present bilaterally [Primitive Reflexes Palmomental] : palmomental reflex present bilaterally [Extraocular Movements] : extraocular movements were intact [FreeTextEntry5] : Twitching of the facial muscles bilaterally [PERRL With Normal Accommodation] : pupils were equal in size, round, reactive to light, with normal accommodation [Hearing Threshold Finger Rub Not [place]] : hearing was normal [Outer Ear] : the ears and nose were normal in appearance [Neck Appearance] : the appearance of the neck was normal [Neck Cervical Mass (___cm)] : no neck mass was observed [FreeTextEntry1] : Decreased range of motion with flexion extension, lateral bending and rotation secondary to pain and stiffness.  Neck is nontender to palpation at bilateral and midline. [Auscultation Breath Sounds / Voice Sounds] : lungs were clear to auscultation bilaterally [Exaggerated Use Of Accessory Muscles For Inspiration] : no accessory muscle use [Heart Rate And Rhythm] : heart rate was normal and rhythm regular [Heart Sounds] : normal S1 and S2 [Murmurs] : no murmurs [Abdomen Soft] : soft [Arterial Pulses Carotid] : carotid pulses were normal with no bruits [Ataxic] : ataxic [Abdomen Tenderness] : non-tender [] : no rash [Skin Lesions] : no skin lesions

## 2024-04-29 ENCOUNTER — RX RENEWAL (OUTPATIENT)
Age: 74
End: 2024-04-29

## 2024-06-05 ENCOUNTER — APPOINTMENT (OUTPATIENT)
Dept: NEUROLOGY | Facility: CLINIC | Age: 74
End: 2024-06-05

## 2024-06-07 ENCOUNTER — RX RENEWAL (OUTPATIENT)
Age: 74
End: 2024-06-07

## 2024-06-07 RX ORDER — DIVALPROEX SODIUM 125 MG/1
125 CAPSULE, COATED PELLETS ORAL
Qty: 120 | Refills: 6 | Status: ACTIVE | COMMUNITY
Start: 2023-03-09 | End: 1900-01-01

## 2024-06-07 RX ORDER — QUETIAPINE FUMARATE 25 MG/1
25 TABLET ORAL TWICE DAILY
Qty: 60 | Refills: 1 | Status: ACTIVE | COMMUNITY
Start: 2024-03-12 | End: 1900-01-01

## 2024-06-10 ENCOUNTER — RX RENEWAL (OUTPATIENT)
Age: 74
End: 2024-06-10

## 2024-06-10 RX ORDER — PAROXETINE HYDROCHLORIDE 10 MG/1
10 TABLET, FILM COATED ORAL DAILY
Qty: 45 | Refills: 1 | Status: ACTIVE | COMMUNITY
Start: 2023-10-03 | End: 1900-01-01

## 2024-06-10 RX ORDER — PAROXETINE HYDROCHLORIDE 20 MG/1
20 TABLET, FILM COATED ORAL DAILY
Qty: 90 | Refills: 3 | Status: ACTIVE | COMMUNITY
Start: 1900-01-01 | End: 1900-01-01

## 2024-06-10 RX ORDER — CHLORHEXIDINE GLUCONATE 4 %
5 LIQUID (ML) TOPICAL
Qty: 90 | Refills: 3 | Status: ACTIVE | COMMUNITY
Start: 2024-03-28 | End: 1900-01-01

## 2024-06-10 RX ORDER — QUETIAPINE FUMARATE 25 MG/1
25 TABLET ORAL
Qty: 15 | Refills: 1 | Status: ACTIVE | COMMUNITY
Start: 2023-11-14 | End: 1900-01-01

## 2024-06-10 RX ORDER — DONEPEZIL HYDROCHLORIDE 5 MG/1
5 TABLET ORAL
Qty: 90 | Refills: 3 | Status: ACTIVE | COMMUNITY
Start: 2023-11-08 | End: 1900-01-01

## 2024-07-05 ENCOUNTER — RX RENEWAL (OUTPATIENT)
Age: 74
End: 2024-07-05

## 2024-07-30 ENCOUNTER — APPOINTMENT (OUTPATIENT)
Dept: NEUROLOGY | Facility: CLINIC | Age: 74
End: 2024-07-30
Payer: MEDICARE

## 2024-07-30 VITALS
DIASTOLIC BLOOD PRESSURE: 95 MMHG | HEART RATE: 74 BPM | WEIGHT: 160 LBS | HEIGHT: 62 IN | BODY MASS INDEX: 29.44 KG/M2 | SYSTOLIC BLOOD PRESSURE: 178 MMHG | OXYGEN SATURATION: 97 %

## 2024-07-30 DIAGNOSIS — R41.3 OTHER AMNESIA: ICD-10-CM

## 2024-07-30 DIAGNOSIS — G25.3 MYOCLONUS: ICD-10-CM

## 2024-07-30 DIAGNOSIS — F03.C4: ICD-10-CM

## 2024-07-30 DIAGNOSIS — R26.81 UNSTEADINESS ON FEET: ICD-10-CM

## 2024-07-30 DIAGNOSIS — F41.9 ANXIETY DISORDER, UNSPECIFIED: ICD-10-CM

## 2024-07-30 PROCEDURE — 99214 OFFICE O/P EST MOD 30 MIN: CPT

## 2024-07-30 PROCEDURE — G2211 COMPLEX E/M VISIT ADD ON: CPT

## 2024-07-30 RX ORDER — CLONAZEPAM 0.25 MG/1
0.25 TABLET, ORALLY DISINTEGRATING ORAL
Qty: 30 | Refills: 1 | Status: ACTIVE | COMMUNITY
Start: 2024-07-30 | End: 1900-01-01

## 2024-07-30 NOTE — PHYSICAL EXAM
[General Appearance - Well Nourished] : well nourished [General Appearance - Alert] : alert [General Appearance - Well Developed] : well developed [Memory Remote] : remote memory was not impaired [Oriented to Person] : oriented to person [Oriented to Time] : oriented to time [Person] : oriented to person [Cranial Nerves Optic (II)] : visual acuity intact bilaterally,  visual fields full to confrontation, pupils equal round and reactive to light [Cranial Nerves Oculomotor (III)] : extraocular motion intact [Cranial Nerves Trigeminal (V)] : facial sensation intact symmetrically [Cranial Nerves Facial (VII)] : face symmetrical [Cranial Nerves Vestibulocochlear (VIII)] : hearing was intact bilaterally [Cranial Nerves Glossopharyngeal (IX)] : tongue and palate midline [Cranial Nerves Accessory (XI - Cranial And Spinal)] : head turning and shoulder shrug symmetric [Cranial Nerves Hypoglossal (XII)] : there was no tongue deviation with protrusion [Motor Strength] : muscle strength was normal in all four extremities [No Muscle Atrophy] : normal bulk in all four extremities [Motor Handedness Right-Handed] : the patient is right hand dominant [Sensation Tactile Decrease] : light touch was intact [Sensation Vibration Decrease] : vibration was intact [Proprioception] : proprioception was intact [Tremor] : a tremor present [2+] : Patella left 2+ [Primitive Reflexes] : primitive reflexes were present [Snout Present] : snout reflex present [Primitive Reflexes Galant] : [place] reflex present bilaterally [Primitive Reflexes Palmomental] : palmomental reflex present bilaterally [PERRL With Normal Accommodation] : pupils were equal in size, round, reactive to light, with normal accommodation [Extraocular Movements] : extraocular movements were intact [Outer Ear] : the ears and nose were normal in appearance [Hearing Threshold Finger Rub Not [place]] : hearing was normal [Neck Appearance] : the appearance of the neck was normal [Neck Cervical Mass (___cm)] : no neck mass was observed [Exaggerated Use Of Accessory Muscles For Inspiration] : no accessory muscle use [Auscultation Breath Sounds / Voice Sounds] : lungs were clear to auscultation bilaterally [Heart Rate And Rhythm] : heart rate was normal and rhythm regular [Heart Sounds] : normal S1 and S2 [Murmurs] : no murmurs [Arterial Pulses Carotid] : carotid pulses were normal with no bruits [Abdomen Soft] : soft [Abdomen Tenderness] : non-tender [Ataxic] : ataxic [] : no rash [Skin Lesions] : no skin lesions [Oriented to Place] : disoriented to place [Place] : disoriented to place [Time] : disoriented to time [Short Term Intact] : short term memory impaired [Remote Intact] : remote memory impaired [Span Intact] : the attention span was decreased [Romberg's Sign] : Romberg's sign was negtive [FreeTextEntry5] : near continuous movement of face, arms and torso with myoclonic movements and tremor.  [FreeTextEntry1] : Decreased range of motion with flexion extension, lateral bending and rotation secondary to pain and stiffness.  Neck is nontender to palpation at bilateral and midline.

## 2024-07-30 NOTE — PHYSICAL EXAM
[General Appearance - Alert] : alert [General Appearance - Well Nourished] : well nourished [General Appearance - Well Developed] : well developed [Memory Remote] : remote memory was not impaired [Oriented to Person] : oriented to person [Oriented to Time] : oriented to time [Person] : oriented to person [Cranial Nerves Optic (II)] : visual acuity intact bilaterally,  visual fields full to confrontation, pupils equal round and reactive to light [Cranial Nerves Oculomotor (III)] : extraocular motion intact [Cranial Nerves Trigeminal (V)] : facial sensation intact symmetrically [Cranial Nerves Facial (VII)] : face symmetrical [Cranial Nerves Vestibulocochlear (VIII)] : hearing was intact bilaterally [Cranial Nerves Glossopharyngeal (IX)] : tongue and palate midline [Cranial Nerves Accessory (XI - Cranial And Spinal)] : head turning and shoulder shrug symmetric [Cranial Nerves Hypoglossal (XII)] : there was no tongue deviation with protrusion [Motor Strength] : muscle strength was normal in all four extremities [No Muscle Atrophy] : normal bulk in all four extremities [Motor Handedness Right-Handed] : the patient is right hand dominant [Sensation Tactile Decrease] : light touch was intact [Sensation Vibration Decrease] : vibration was intact [Proprioception] : proprioception was intact [Tremor] : a tremor present [2+] : Patella left 2+ [Primitive Reflexes] : primitive reflexes were present [Snout Present] : snout reflex present [Primitive Reflexes Galant] : [place] reflex present bilaterally [Primitive Reflexes Palmomental] : palmomental reflex present bilaterally [PERRL With Normal Accommodation] : pupils were equal in size, round, reactive to light, with normal accommodation [Extraocular Movements] : extraocular movements were intact [Outer Ear] : the ears and nose were normal in appearance [Hearing Threshold Finger Rub Not [place]] : hearing was normal [Neck Appearance] : the appearance of the neck was normal [Neck Cervical Mass (___cm)] : no neck mass was observed [Exaggerated Use Of Accessory Muscles For Inspiration] : no accessory muscle use [Auscultation Breath Sounds / Voice Sounds] : lungs were clear to auscultation bilaterally [Heart Rate And Rhythm] : heart rate was normal and rhythm regular [Heart Sounds] : normal S1 and S2 [Murmurs] : no murmurs [Arterial Pulses Carotid] : carotid pulses were normal with no bruits [Abdomen Soft] : soft [Abdomen Tenderness] : non-tender [Ataxic] : ataxic [] : no rash [Skin Lesions] : no skin lesions [Oriented to Place] : disoriented to place [Place] : disoriented to place [Time] : disoriented to time [Short Term Intact] : short term memory impaired [Remote Intact] : remote memory impaired [Span Intact] : the attention span was decreased [Romberg's Sign] : Romberg's sign was negtive [FreeTextEntry5] : near continuous movement of face, arms and torso with myoclonic movements and tremor.  [FreeTextEntry1] : Decreased range of motion with flexion extension, lateral bending and rotation secondary to pain and stiffness.  Neck is nontender to palpation at bilateral and midline.

## 2024-07-30 NOTE — HISTORY OF PRESENT ILLNESS
[FreeTextEntry1] : Today: Here with Daughter. She has been having severe anxiety. She states she is worried about a truck going around the property. She is worried about dying. She s tearful on today's visit. Because of the anxiety she hasn't been eating as much. Her sleep has been good however. She was seen at [place] for chest pain and evaluation was negative. She has her daughter check around the apartment to make sure no one else is there. The tremors can be worse depending on her mood. Her Seroquel was upped to 37.5mg at night.  a [facility] independent  came to the house. They are trying to find out what services she qualifies for. She should be getting a home aid. Her brother is home with her during the day but he has mobility issues. Daughter gives her dose of 20mg paroxetine in the evening, and then 5mg when needed.    Since last visit: Having vivid dreams. She will see a group of men or teenagers and they are chasing her and she is scared. She feels anxious and phobic.  She will see them when she gets up. Her appetite has been poor. Only eating crackers or nuts, not a full meal. All day she feels like getting electrical stimulation on the back of her neck. Per her daughter about 2 weeks ago she called her saying that someone was going to take her, much more paranoid, afraid something bad was going to happen. They were doing work on her apartment building roof but she got really worked up. She will be afraid of people, that they have bad intentions towards her. She is becoming more agoraphobic.  She did not sleep for 2 nights. She was up walking around checking her apartment.  Shaking has also gotten worse. Over the weekend was worse. She at times almost falls out of bed. She does have fighting in her sleep.   She has family hx of her mother and aunt with "parkinsons" with similar shaking as her.   Seroquel is 25mg at night, paroxetine 25mg , donepezil 5mg at night, memantine 5mg VPA 350mg BID.

## 2024-07-30 NOTE — HISTORY OF PRESENT ILLNESS
[FreeTextEntry1] : Today: Here with Daughter. She has been having severe anxiety. She states she is worried about a truck going around the property. She is worried about dying. She s tearful on today's visit. Because of the anxiety she hasn't been eating as much. Her sleep has been good however. She was seen at [facility] for chest pain and evaluation was negative. She has her daughter check around the apartment to make sure no one else is there. The tremors can be worse depending on her mood. Her Seroquel was upped to 37.5mg at night.  a [facility] independent  came to the house. They are trying to find out what services she qualifies for. She should be getting a home aid. Her brother is home with her during the day but he has mobility issues. Daughter gives her dose of 20mg paroxetine in the evening, and then 5mg when needed.    Since last visit: Having vivid dreams. She will see a group of men or teenagers and they are chasing her and she is scared. She feels anxious and phobic.  She will see them when she gets up. Her appetite has been poor. Only eating crackers or nuts, not a full meal. All day she feels like getting electrical stimulation on the back of her neck. Per her daughter about 2 weeks ago she called her saying that someone was going to take her, much more paranoid, afraid something bad was going to happen. They were doing work on her apartment building roof but she got really worked up. She will be afraid of people, that they have bad intentions towards her. She is becoming more agoraphobic.  She did not sleep for 2 nights. She was up walking around checking her apartment.  Shaking has also gotten worse. Over the weekend was worse. She at times almost falls out of bed. She does have fighting in her sleep.   She has family hx of her mother and aunt with "parkinsons" with similar shaking as her.   Seroquel is 25mg at night, paroxetine 25mg , donepezil 5mg at night, memantine 5mg VPA 350mg BID.

## 2024-07-30 NOTE — DISCUSSION/SUMMARY
[FreeTextEntry1] : 73-year-old female with history of myoclonic seizures, anxiety disorder, cervicalgia with headache, and dementia with psychosis. She is having significantly more paranoia about strangers and going outside, limiting her going outside the home, which is now to the point of agoraphobia.. Her tremors and myoclonus have also gotten significantly worse. Possible Lewy Body dementia  vs paraneoplastic syndrome vs less likely CJD. She had two family members diagnosed with PD with similar presentations as hers. Her anxiety has been significantly worse.    F/u geriatric psychiatry increase Paroxetine to30mg in the morning, 10mg in the afternoon as needed Add Clonazepam 0.25mg in the afternoon food schedule, increase protein intake increase hydration 2-3 bottles of water a day Advised no driving because of lack of coordination and anxiety Daughter to call back in 4 weeks. To then optimize memantine and donepezil.    1. For anxiety continue paroxetine 30 mg daily 2. For myoclonus continue Depakote 250 mg every 12 hours. 3. For dementia with psychosis continue donepezil 5 mg nightly, continue Seroquel 37.5 mg nightly. Also helping with sleep. Continue melatonin 5mg at night.  4. Will send in a referral for home physical therapy through [facility].   RTC 3 months

## 2024-08-19 ENCOUNTER — NON-APPOINTMENT (OUTPATIENT)
Age: 74
End: 2024-08-19

## 2024-09-03 ENCOUNTER — RX RENEWAL (OUTPATIENT)
Age: 74
End: 2024-09-03

## 2024-12-09 ENCOUNTER — APPOINTMENT (OUTPATIENT)
Dept: NEUROLOGY | Facility: CLINIC | Age: 74
End: 2024-12-09